# Patient Record
(demographics unavailable — no encounter records)

---

## 2025-01-16 NOTE — PHYSICAL EXAM
[de-identified] : Physical exam shows the patient to be alert and oriented x3, capable of ambulation. The patient is well-developed and well-nourished in no apparent respiratory distress. Majority of the skin is intact bilaterally in the upper extremities without lymphadenopathy at the elbows.  There is tenderness over the right greater than left basal joint with a positive grind test and shoulder deformity. There is a positive crank test. There is swelling appreciated over the affected CMC joint which is not present on the opposite side. There is no evidence of infection or lymphadenopathy bilaterally to the level of the elbows There is no evidence of MCP hyperextension bilaterally. There is a negative Finkelstein's test There is no tenderness over the A-1 pulleys bilaterally. 5/5 strength bilaterally.   The wrists have a symmetric and full range of motion bilaterally with no pain upon forced flexion, extension, pronation and supination. There is no tenderness over the scaphoid scapholunate region ligaments and no tenderness of the TFCC bilaterally. There is no tenderness of the pisotriquetral hamate hook. The second through fifth CMC his is stable and nontender bilaterally.  strength 60 pounds bilaterally Right-hand-dominant There is a negative carpal tunnel compression test or Tinel's bilaterally.   [de-identified] : PA lateral and obliques of both hands shows advanced degenerative changes sclerosis and osteophytes of the thumb CMC joint greater than STT joint.  There is a cyst in the right scaphoid which is clinically asymptomatic.  No evidence of radiocarpal midcarpal radial ulnar joint arthritis.

## 2025-01-16 NOTE — ASSESSMENT
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  The risks benefits and alternatives were discussed with the patient in the office. This ranged from conservative care, injection, to surgical intervention.  Patient elected to proceed with a Kenalog and lidocaine injection into the right CMC joint.  This is despite the risks of bleeding, infection, nerve injury, tendon rupture, depigmentation, fat necrosis, tissue loss and other issues associated with injection.  After the region was prepped and draped in the usual sterile fashion skin S. anesthesia was achieved with ethylchloride. 1 cc of Kenalog 10 and 1 cc of 2% lidocaine was instilled directly into the CMC joint. Hemostasis was obtained by direct pressure and a sterile Band-Aid was applied.  Holistic medicines such as santo, arnica cream, activity modifications and intermittent splinting were also discussed.  It is hoped that this gives a long-lasting beneficial therapeutic effect but his symptoms are not  fully resolved in the next 4-6 weeks the patient was encouraged to return to my office for reevaluation and consideration of other forms of treatment.

## 2025-01-16 NOTE — HISTORY OF PRESENT ILLNESS
[Right] : right hand dominant [FreeTextEntry1] : Patient presents today for evaluation of bilateral CMC joint pain which has been ongoing for 3 years.  Patient has not treated his symptoms medically.  He denies any trauma or injury.  Patient reports that the pain is localized at the base of both thumbs.